# Patient Record
Sex: MALE | Race: ASIAN
[De-identification: names, ages, dates, MRNs, and addresses within clinical notes are randomized per-mention and may not be internally consistent; named-entity substitution may affect disease eponyms.]

---

## 2023-02-14 PROBLEM — Z00.00 ENCOUNTER FOR PREVENTIVE HEALTH EXAMINATION: Status: ACTIVE | Noted: 2023-02-14

## 2023-03-04 ENCOUNTER — APPOINTMENT (OUTPATIENT)
Dept: PLASTIC SURGERY | Facility: CLINIC | Age: 28
End: 2023-03-04
Payer: COMMERCIAL

## 2023-03-04 VITALS
WEIGHT: 170 LBS | BODY MASS INDEX: 25.18 KG/M2 | TEMPERATURE: 98.2 F | HEIGHT: 69 IN | OXYGEN SATURATION: 98 % | HEART RATE: 92 BPM | DIASTOLIC BLOOD PRESSURE: 76 MMHG | RESPIRATION RATE: 17 BRPM | SYSTOLIC BLOOD PRESSURE: 151 MMHG

## 2023-03-04 DIAGNOSIS — L72.9 FOLLICULAR CYST OF THE SKIN AND SUBCUTANEOUS TISSUE, UNSPECIFIED: ICD-10-CM

## 2023-03-04 PROCEDURE — 99203 OFFICE O/P NEW LOW 30 MIN: CPT

## 2023-03-27 PROBLEM — L72.9 CYST OF SKIN: Status: ACTIVE | Noted: 2023-03-27

## 2023-03-27 NOTE — PROCEDURE
[Nl] : None [FreeTextEntry1] : Right buttock mass [FreeTextEntry2] : Excision of right buttock mass 3cm [FreeTextEntry6] : After the area was prepped and draped, the area was infiltrated with 1%lidocaine with epi. The mass was marked and incision was made around it. The mass was dissected from surrounding tissue and removed. It measured 3cm. This was sent to pathology.\par \par The wound was then irrigated, and the wound was closed with 4-0 vicryl for the dermis and 4-0 monocryl for the skin.  A dressing was applied. Pt tolerated well procedure. [FreeTextEntry7] : b